# Patient Record
Sex: MALE | Race: WHITE | NOT HISPANIC OR LATINO | Employment: FULL TIME | URBAN - METROPOLITAN AREA
[De-identification: names, ages, dates, MRNs, and addresses within clinical notes are randomized per-mention and may not be internally consistent; named-entity substitution may affect disease eponyms.]

---

## 2024-04-08 ENCOUNTER — OFFICE VISIT (OUTPATIENT)
Dept: NEUROSURGERY | Facility: CLINIC | Age: 49
End: 2024-04-08
Payer: COMMERCIAL

## 2024-04-08 VITALS
DIASTOLIC BLOOD PRESSURE: 70 MMHG | BODY MASS INDEX: 25.2 KG/M2 | HEART RATE: 78 BPM | RESPIRATION RATE: 20 BRPM | SYSTOLIC BLOOD PRESSURE: 138 MMHG | WEIGHT: 180 LBS | HEIGHT: 71 IN

## 2024-04-08 DIAGNOSIS — M89.8X8 SKULL MASS: Primary | ICD-10-CM

## 2024-04-08 PROCEDURE — 99202 OFFICE O/P NEW SF 15 MIN: CPT | Performed by: NEUROLOGICAL SURGERY

## 2024-04-08 PROCEDURE — 1036F TOBACCO NON-USER: CPT | Performed by: NEUROLOGICAL SURGERY

## 2024-04-08 ASSESSMENT — ENCOUNTER SYMPTOMS
RESPIRATORY NEGATIVE: 1
NEUROLOGICAL NEGATIVE: 1
HEMATOLOGIC/LYMPHATIC NEGATIVE: 1
PSYCHIATRIC NEGATIVE: 1
EYES NEGATIVE: 1
CONSTITUTIONAL NEGATIVE: 1
BACK PAIN: 1
GASTROINTESTINAL NEGATIVE: 1
CARDIOVASCULAR NEGATIVE: 1
ENDOCRINE NEGATIVE: 1

## 2024-04-08 ASSESSMENT — PAIN SCALES - GENERAL: PAINLEVEL: 0-NO PAIN

## 2024-04-08 NOTE — PROGRESS NOTES
"Few years ago hit his head and 2 months ago noticed swelling. Saw a skin doctor and said possible osteoma. Saw plastic surgeon and sent here. Has not had any imaging.    48-year-old male presents for evaluation of mass just above his right eye.  The patient relates a history of trauma there years ago.  Over the last 2 months, he has noticed some swelling and fullness in the region.  He was seen by dermatology and plastic surgery about the concern for osteoma.  He denies any headache, visual changes, or other neurologic symptoms.    Review of Systems   Constitutional: Negative.    HENT: Negative.     Eyes: Negative.    Respiratory: Negative.     Cardiovascular: Negative.    Gastrointestinal: Negative.    Endocrine: Negative.    Genitourinary: Negative.    Musculoskeletal:  Positive for back pain.   Skin: Negative.    Allergic/Immunologic: Positive for environmental allergies.   Neurological: Negative.    Hematological: Negative.    Psychiatric/Behavioral: Negative.         Visit Vitals  /70   Pulse 78   Resp 20   Ht 1.803 m (5' 11\")   Wt 81.6 kg (180 lb)   BMI 25.10 kg/m²   BSA 2.02 m²         No current outpatient medications on file.      Objective   Neurological Exam    On physical exam, the patient is alert and interactive.  Extraocular movements are intact.  Face and tongue moves symmetrically.  He moves all extremities with good strength.  Gait is within normal limits.  There is a slight fullness just above the right orbital rim.  This is immobile but is difficult to ascertain if it has the consistency of hardened soft tissue or bone.    At this time the etiology of the lesion is not clear.  I would like to obtain a thin cut CT to evaluate the skull and soft tissue before determining whether or not the patient would benefit from any surgical intervention.  "

## 2024-04-25 ENCOUNTER — HOSPITAL ENCOUNTER (OUTPATIENT)
Dept: RADIOLOGY | Facility: HOSPITAL | Age: 49
Discharge: HOME | End: 2024-04-25
Payer: COMMERCIAL

## 2024-04-25 DIAGNOSIS — M89.8X8 SKULL MASS: ICD-10-CM

## 2024-04-25 PROCEDURE — 76376 3D RENDER W/INTRP POSTPROCES: CPT | Performed by: RADIOLOGY

## 2024-04-25 PROCEDURE — 70486 CT MAXILLOFACIAL W/O DYE: CPT

## 2024-04-25 PROCEDURE — 70486 CT MAXILLOFACIAL W/O DYE: CPT | Performed by: RADIOLOGY

## 2024-04-25 PROCEDURE — 76377 3D RENDER W/INTRP POSTPROCES: CPT
